# Patient Record
Sex: MALE | Race: WHITE
[De-identification: names, ages, dates, MRNs, and addresses within clinical notes are randomized per-mention and may not be internally consistent; named-entity substitution may affect disease eponyms.]

---

## 2020-12-07 ENCOUNTER — HOSPITAL ENCOUNTER (INPATIENT)
Dept: HOSPITAL 1 - ED | Age: 40
LOS: 2 days | Discharge: HOME | DRG: 446 | End: 2020-12-09
Payer: COMMERCIAL

## 2020-12-07 VITALS — DIASTOLIC BLOOD PRESSURE: 94 MMHG | SYSTOLIC BLOOD PRESSURE: 151 MMHG

## 2020-12-07 VITALS
BODY MASS INDEX: 29.02 KG/M2 | WEIGHT: 170 LBS | BODY MASS INDEX: 29.02 KG/M2 | HEIGHT: 64 IN | HEIGHT: 64 IN | WEIGHT: 170 LBS

## 2020-12-07 VITALS — DIASTOLIC BLOOD PRESSURE: 97 MMHG | SYSTOLIC BLOOD PRESSURE: 138 MMHG

## 2020-12-07 DIAGNOSIS — N17.9: ICD-10-CM

## 2020-12-07 DIAGNOSIS — E11.22: ICD-10-CM

## 2020-12-07 DIAGNOSIS — E66.9: ICD-10-CM

## 2020-12-07 DIAGNOSIS — I12.9: ICD-10-CM

## 2020-12-07 DIAGNOSIS — Z20.828: ICD-10-CM

## 2020-12-07 DIAGNOSIS — N18.30: ICD-10-CM

## 2020-12-07 DIAGNOSIS — Z95.818: ICD-10-CM

## 2020-12-07 DIAGNOSIS — N13.6: Primary | ICD-10-CM

## 2020-12-07 LAB
ALBUMIN SERPL-MCNC: 3 G/DL (ref 3.4–5)
ALP SERPL-CCNC: 96 U/L (ref 46–116)
ALT SERPL-CCNC: 76 U/L (ref 16–63)
AMPHETAMINES UR QL SCN: POSITIVE
AST SERPL-CCNC: 42 U/L (ref 15–37)
BASOPHILS NFR BLD: 0.3 % (ref 0–2)
BILIRUB SERPL-MCNC: 0.4 MG/DL (ref 0.2–1)
BUN SERPL-MCNC: 14 MG/DL (ref 7–18)
CALCIUM SERPL-MCNC: 8.5 MG/DL (ref 8.5–10.1)
CHLORIDE SERPL-SCNC: 101 MMOL/L (ref 98–107)
CO2 SERPL-SCNC: 23.5 MMOL/L (ref 21–32)
CREAT SERPL-MCNC: 1.6 MG/DL (ref 0.7–1.3)
ERYTHROCYTE [DISTWIDTH] IN BLOOD BY AUTOMATED COUNT: 12.7 % (ref 11.5–14.5)
GFR SERPLBLD BASED ON 1.73 SQ M-ARVRAT: 51 ML/MIN
GLUCOSE SERPL-MCNC: 102 MG/DL (ref 74–106)
MICROSCOPIC UR-IMP: YES
PLATELET # BLD: 230 X10^3MCL (ref 130–400)
POTASSIUM SERPL-SCNC: 3.7 MMOL/L (ref 3.5–5.1)
PROT SERPL-MCNC: 7.7 G/DL (ref 6.4–8.2)
RBC # UR STRIP.AUTO: (no result) /UL
SODIUM SERPL-SCNC: 132 MMOL/L (ref 136–145)
UA SPECIFIC GRAVITY: 1.02 (ref 1–1.03)

## 2020-12-07 PROCEDURE — G0378 HOSPITAL OBSERVATION PER HR: HCPCS

## 2020-12-07 PROCEDURE — C1769 GUIDE WIRE: HCPCS

## 2020-12-07 PROCEDURE — C1758 CATHETER, URETERAL: HCPCS

## 2020-12-07 NOTE — NUR
PT BIB [facility] C/O LLQ PAIN X 1 DAY. MEDIC REPORTS PICKING PT UP FROM HOME DUE
TO PT C/O LLQ ABDOMINAL PAIN RADIATING TO THE LEFT FLANK. PT REPORTS "THE PAIN
STARTED YESTERDAY". MEDIC STATES PT HAS CONFIRMED KIDNEY STONES TO BILATERAL
KIDNEYS AND HEMATURIA. PT REPORTS URINATING "BRIGHT RED" BLOOD. PER PT "I HAD 
TWO
STENTS PLACED TO HELP PASS THE STONES". PT REPORTS HE IS SCHEDULED FOR SURGERY
"ON TUES[place] [facility] [place]ENTS [place]". PT STATES "THEY WERE GOING TO
USE A CAMERA TO LOOK AT THE STONES". PT REPORTS HX OF KIDNEY STONES AND TYPE 2
DIABETES. BLOOD SUGAR AT BEDSIDE 89. PT STATES PAIN 10/10 AT THIS TIME, AAOX4,
RESP E/U, NO DISTRESS NOTED. PT GOWNED AND PLACED ON FULL CM, NSR NOTED.
AWAITING MSE BY MD

## 2020-12-07 NOTE — NUR
PATIENT RECEIVED FROM ER VIA WHEELCHAIR. PAIN 8/10 TO LLQ. PAIN MEDICATION
GIVEN AS ORDERED. PATIENT IS AAO X4. ABLE TO MAKE NEEDS KNOWN. ADMISSION
DATABASE COMPLETED. 22 GAUGE IV TO RAC, PATENT AND INTACT WITH NA @ 100ML FROM
ER AS ORDERED X1. BED IN LOWEST POSITION. PATIENT AMBULATED TO BATHROOM
WITH STEADY GAIT. DENIES CHEST PAIN. ON ROOM AIT. NO SIGN OF RESPIRATORY
DISTRESS NOTED. PATIENT HAS MULTIPLE TATTOOS AAL OVER BODY. SKIN INTACT
AND CLEAR. PATIENT STATED LAST BM 12/06/2020.
WILL CONTINUE TO MONTIOR PATIENT.

## 2020-12-07 NOTE — NUR
AWAKE ALERT, STATED MILD ABDOMINAL PAIN, LEFT SIDE
ABDOMEN SOFT TENDER LT LOWER QUAD,SKIN WARM AND DRY TOTOUCH, RESP. EASY BREATH
SOUNDS CLEAR, IV THERAPY NSS IN PROGRESS,

## 2020-12-07 NOTE — NUR
PT IN BED RESTING, EASY TO AROUSE. PT PENDING TRANSPORT TO ADMIT. PT NAD NOTED,
RESP E/U, AAOX4, CALL LIGHT IN REACH

## 2020-12-08 VITALS — SYSTOLIC BLOOD PRESSURE: 145 MMHG | DIASTOLIC BLOOD PRESSURE: 95 MMHG

## 2020-12-08 VITALS — DIASTOLIC BLOOD PRESSURE: 82 MMHG | SYSTOLIC BLOOD PRESSURE: 128 MMHG

## 2020-12-08 VITALS — DIASTOLIC BLOOD PRESSURE: 101 MMHG | SYSTOLIC BLOOD PRESSURE: 158 MMHG

## 2020-12-08 VITALS — DIASTOLIC BLOOD PRESSURE: 86 MMHG | SYSTOLIC BLOOD PRESSURE: 136 MMHG

## 2020-12-08 LAB
ALBUMIN SERPL-MCNC: 2.7 G/DL (ref 3.4–5)
ALP SERPL-CCNC: 73 U/L (ref 46–116)
ALT SERPL-CCNC: 71 U/L (ref 16–63)
AST SERPL-CCNC: 55 U/L (ref 15–37)
BASOPHILS NFR BLD: 0.5 % (ref 0–2)
BILIRUB SERPL-MCNC: 0.5 MG/DL (ref 0.2–1)
BUN SERPL-MCNC: 10 MG/DL (ref 7–18)
CALCIUM SERPL-MCNC: 8.5 MG/DL (ref 8.5–10.1)
CHLORIDE SERPL-SCNC: 101 MMOL/L (ref 98–107)
CHOLEST SERPL-MCNC: 153 MG/DL (ref ?–200)
CHOLEST/HDLC SERPL: 5.1 MG/DL
CO2 SERPL-SCNC: 23.2 MMOL/L (ref 21–32)
CREAT SERPL-MCNC: 1.5 MG/DL (ref 0.7–1.3)
ERYTHROCYTE [DISTWIDTH] IN BLOOD BY AUTOMATED COUNT: 12.6 % (ref 11.5–14.5)
GFR SERPLBLD BASED ON 1.73 SQ M-ARVRAT: 55 ML/MIN
GLUCOSE SERPL-MCNC: 103 MG/DL (ref 74–106)
HDLC SERPL-MCNC: 30 MG/DL (ref 40–60)
MAGNESIUM SERPL-MCNC: 2.1 MG/DL (ref 1.8–2.4)
PHOSPHATE SERPL-MCNC: 2.8 MG/DL (ref 2.5–4.9)
PLATELET # BLD: 218 X10^3MCL (ref 130–400)
POTASSIUM SERPL-SCNC: 4 MMOL/L (ref 3.5–5.1)
PROT SERPL-MCNC: 7.3 G/DL (ref 6.4–8.2)
SODIUM SERPL-SCNC: 134 MMOL/L (ref 136–145)
TRIGL SERPL-MCNC: 141 MG/DL (ref ?–150)

## 2020-12-08 PROCEDURE — 0TC68ZZ EXTIRPATION OF MATTER FROM RIGHT URETER, VIA NATURAL OR ARTIFICIAL OPENING ENDOSCOPIC: ICD-10-PCS

## 2020-12-08 PROCEDURE — BT141ZZ FLUOROSCOPY OF KIDNEYS, URETERS AND BLADDER USING LOW OSMOLAR CONTRAST: ICD-10-PCS

## 2020-12-08 PROCEDURE — 0TC78ZZ EXTIRPATION OF MATTER FROM LEFT URETER, VIA NATURAL OR ARTIFICIAL OPENING ENDOSCOPIC: ICD-10-PCS

## 2020-12-08 NOTE — NUR
PT AWAKE AND EXPERIENCING PAIN TO RUQ - 7/10. PT NOTED TO BE GRIMACING AND
GUARDING.  NORCO 5-325MG ADMINISTRED AS ORDERED.  IV TO RAC INFILTRATED, D/C
AND RESTARTED TO LAC 20G, NS RUNNING @80ML/HR, NO COMPLICATIONS TO SITE. ALL
NEEDS MET AT THIS TIME.  BED IN LOWEST POSITION, SIDE RAILS X 2, CALL LIGHT
WITHIN REACH

## 2020-12-08 NOTE — NUR
PT RECEIVED LYING IN BED SUPINE TALKING ON THE PHONE.  A/OX4, CALM AND
COOPERATIVE.  RESPIRATIONS EVEN UNLABORED, CTA, RA, DENIES SOB.  DENIES CHEST
PAIN, LIGHTHEADEDNESS/DIZZINESS.  PERIPHERAL PULSES PALPABLE, NO EDEMA NOTED.
AND SOFT AND ROUND, NON TENDER, DENIES N/V, LBM 12/06, NORMAL.  HEMATURIA.
AMBUALTORY WITH STEADY GAIT.  SKIN INTACT.  IV RAC 22G RUNNING NS@100ML/HR,
PATENT, NO OMPLICATIONS TO SITE. BED IN LOWEST POSITION, SIDE RAILS X2, CALL
LIGHT WITHIN REACH

## 2020-12-09 VITALS — DIASTOLIC BLOOD PRESSURE: 86 MMHG | SYSTOLIC BLOOD PRESSURE: 134 MMHG

## 2020-12-09 VITALS — DIASTOLIC BLOOD PRESSURE: 68 MMHG | SYSTOLIC BLOOD PRESSURE: 106 MMHG

## 2020-12-09 VITALS — SYSTOLIC BLOOD PRESSURE: 117 MMHG | DIASTOLIC BLOOD PRESSURE: 73 MMHG

## 2020-12-09 VITALS — DIASTOLIC BLOOD PRESSURE: 73 MMHG | SYSTOLIC BLOOD PRESSURE: 117 MMHG

## 2020-12-09 LAB
ALBUMIN SERPL-MCNC: 2.8 G/DL (ref 3.4–5)
ALP SERPL-CCNC: 77 U/L (ref 46–116)
ALT SERPL-CCNC: 64 U/L (ref 16–63)
AST SERPL-CCNC: 43 U/L (ref 15–37)
BASOPHILS NFR BLD: 0.2 % (ref 0–2)
BILIRUB SERPL-MCNC: 0.5 MG/DL (ref 0.2–1)
BUN SERPL-MCNC: 11 MG/DL (ref 7–18)
CALCIUM SERPL-MCNC: 8.4 MG/DL (ref 8.5–10.1)
CHLORIDE SERPL-SCNC: 99 MMOL/L (ref 98–107)
CO2 SERPL-SCNC: 25.1 MMOL/L (ref 21–32)
CREAT SERPL-MCNC: 1.6 MG/DL (ref 0.7–1.3)
ERYTHROCYTE [DISTWIDTH] IN BLOOD BY AUTOMATED COUNT: 13.1 % (ref 11.5–14.5)
GFR SERPLBLD BASED ON 1.73 SQ M-ARVRAT: 51 ML/MIN
GLUCOSE SERPL-MCNC: 98 MG/DL (ref 74–106)
MAGNESIUM SERPL-MCNC: 1.9 MG/DL (ref 1.8–2.4)
PHOSPHATE SERPL-MCNC: 3.8 MG/DL (ref 2.5–4.9)
PLATELET # BLD: 243 X10^3MCL (ref 130–400)
POTASSIUM SERPL-SCNC: 4 MMOL/L (ref 3.5–5.1)
PROT SERPL-MCNC: 7.8 G/DL (ref 6.4–8.2)
SODIUM SERPL-SCNC: 135 MMOL/L (ref 136–145)

## 2020-12-09 NOTE — NUR
LATE ENTRY- 0800 AM NOTES
 
PATIENT ASLEEP IN BED. VS STABLE , PATIENT WOKE UP TO TAKE MORNING MEDS. NO
COMPLAINTS OF PAIN OR DISCOMFORT AT THIS TIME.

## 2020-12-09 NOTE — NUR
PT SLEEPING IN SUPINE POSITION WITH HOB ELEVATED 30.  NO REPSIRATORY DISTRESS
NOTED.  NON VERBAL PAIN INDICATORS ABSENT. SERNA PATENT AND DRAINING TO
GRAVITY, HEMATURIA.  IV TO R UPPER ARM, PATENT, RUNNING NS@80ML/HR, NO
COMPLICATIONS TO SITE.  BED IN LOWEST POSITION, SIDE RAILS X 2, CALL LIGHT
WITHIN REACH

## 2020-12-09 NOTE — NUR
SERNA CATHETER 16FR REMOVED AT 06:00, 10ML FLUID REMOVED FROM BUBBLE.
CATHETER EASILY REMOVED WITH NO RESISTANCE.   PT TOLERATED WELL.  STRING
REAMINS INTACT.  PT EDUCATED TO BE CAUTIOUS WHEN USING RESTROOM TO NOT PULL
STRING BACK OR OUT.  PT ALSO EDUCATED TO INFORM RN SHOULD HE HAD DIFFICULTY
URINATING, FEELS DISTENDED OR STRING DISLODGES.  PT VERBALIZED UNDERSTANDING.
URINAL PROVIDED AT BEDSIDE.

## 2020-12-09 NOTE — NUR
PT REMAINS A/OX4 AND ON 2L NC POST OP. PT DENIES DIFFICULTY BREATHING OR SOB
BUT REMAINS DROWSY.  NO BM DURING SHIFT.  SERNA D/C AT 06:00.  PT CONTINUES TO
HAVE HEMATURIA. PT DENIES PAIN.  IV R UPPER ARM PATENT, RUNNINGN NS@80ML/HR.
BED IN LOWEST POSITION, SIDE RAILS X 2, CALL LIGHT WITHIN REACH

## 2020-12-09 NOTE — NUR
LATE ENTRY- 1200 NOTES
 
PATIENT VITALS STABLE. NO PAIN STATED OR NOTED AT THIS TIME. PATIENT -
NO INSULIN NEEDED. PATIENT HAS JUST BEEN RESTING. NO COMPLAINTS.

## 2020-12-18 LAB
ALBUMIN SERPL-MCNC: 3.1 G/DL (ref 3.4–5)
ALP SERPL-CCNC: 103 U/L (ref 46–116)
ALT SERPL-CCNC: 189 U/L (ref 16–63)
AST SERPL-CCNC: 194 U/L (ref 15–37)
BASOPHILS NFR BLD: 1.3 % (ref 0.2–1.5)
BILIRUB SERPL-MCNC: 0.4 MG/DL (ref 0.2–1)
BUN SERPL-MCNC: 14 MG/DL (ref 7–18)
CALCIUM SERPL-MCNC: 8.5 MG/DL (ref 8.5–10.1)
CHLORIDE SERPL-SCNC: 103 MMOL/L (ref 98–107)
CO2 SERPL-SCNC: 25.5 MMOL/L (ref 21–32)
CREAT SERPL-MCNC: 1.3 MG/DL (ref 0.7–1.3)
ERYTHROCYTE [DISTWIDTH] IN BLOOD BY AUTOMATED COUNT: 12.7 % (ref 12.1–16.2)
GFR SERPLBLD BASED ON 1.73 SQ M-ARVRAT: > 60 ML/MIN
GLUCOSE SERPL-MCNC: 102 MG/DL (ref 74–106)
PLATELET # BLD: 305 X10^3MCL (ref 152–348)
POTASSIUM SERPL-SCNC: 3.9 MMOL/L (ref 3.5–5.1)
PROT SERPL-MCNC: 8.5 G/DL (ref 6.4–8.2)
RBC MORPH BLD: NORMAL
SODIUM SERPL-SCNC: 138 MMOL/L (ref 136–145)

## 2020-12-23 ENCOUNTER — HOSPITAL ENCOUNTER (OUTPATIENT)
Dept: HOSPITAL 1 - DS | Age: 40
Discharge: HOME | End: 2020-12-23
Payer: COMMERCIAL

## 2020-12-23 VITALS — WEIGHT: 220 LBS | HEIGHT: 65 IN | BODY MASS INDEX: 36.65 KG/M2

## 2020-12-23 VITALS — SYSTOLIC BLOOD PRESSURE: 128 MMHG | DIASTOLIC BLOOD PRESSURE: 93 MMHG

## 2020-12-23 VITALS — SYSTOLIC BLOOD PRESSURE: 126 MMHG | DIASTOLIC BLOOD PRESSURE: 92 MMHG

## 2020-12-23 DIAGNOSIS — E11.9: ICD-10-CM

## 2020-12-23 DIAGNOSIS — E66.3: ICD-10-CM

## 2020-12-23 DIAGNOSIS — Z79.84: ICD-10-CM

## 2020-12-23 DIAGNOSIS — Z79.899: ICD-10-CM

## 2020-12-23 DIAGNOSIS — I10: ICD-10-CM

## 2020-12-23 DIAGNOSIS — N13.2: Primary | ICD-10-CM

## 2020-12-23 PROCEDURE — C2625 STENT, NON-COR, TEM W/DEL SY: HCPCS

## 2020-12-23 PROCEDURE — C1769 GUIDE WIRE: HCPCS

## 2023-08-04 NOTE — NUR
PT RECEIVED FROM OR. PT DROWSY BUT EASILY AROUSABLE TO VERBAL AND TACTILE
STIMULI.  A/OX4, CALM AND COOPERATIVE.  RESPIRATIONS EVEN UNLABORED,
DIMINISHED, 2L NC, DENIES SOB.  DENIES LIGHTHEADEDNESS/DIZZINESS.  BS ACTIVE,
ABD SOFT AND ROUND, NON TENDER, LBM 12/06.  SERNA PATENT, NO COMPLICATIONS TO
SITE, HEMATURIA. STRING EXITING FROM TIP OF PENIS INTACT.  PT AMBULATORY WITH
STEADY GAIT. SKIN INTACT.  PT DENIES PAIN AT THIS TIME.  IV R UPPER ARM,
PATENT, RUNNING NS@80ML/HR, NO COMPLICATIONS TO SITE.  BED IN LOWEST
POSITION, SIDE RAILS X 2, CALL LGIHT WITHIN REACH Tolerating well without any issues.  Stress test 12/2020, no ischemia    CT Angio coronary arteries 2/2021:    FINDINGS:     CORONARY ARTERIES:  Calcium Score: 0     Dominance: Right.  Variant Anatomy: None.     Left Main (LM): Patent.  Left Anterior Descending (LAD): Patent.  Left Circumflex (LCx): Patent.  Ramus Intermedius: Not present.  Right Coronary (RCA): Patent.     CARDIAC:  No incidental cardiac finding.    -Can take an extra 1/2 tablet PRN for AF events.